# Patient Record
Sex: FEMALE | URBAN - METROPOLITAN AREA
[De-identification: names, ages, dates, MRNs, and addresses within clinical notes are randomized per-mention and may not be internally consistent; named-entity substitution may affect disease eponyms.]

---

## 2022-06-24 ENCOUNTER — OFFICE (OUTPATIENT)
Dept: URBAN - METROPOLITAN AREA CLINIC 90 | Facility: CLINIC | Age: 52
Setting detail: OPHTHALMOLOGY
End: 2022-06-24
Payer: COMMERCIAL

## 2022-06-24 DIAGNOSIS — H25.043: ICD-10-CM

## 2022-06-24 DIAGNOSIS — H35.373: ICD-10-CM

## 2022-06-24 PROCEDURE — 92004 COMPRE OPH EXAM NEW PT 1/>: CPT | Performed by: OPHTHALMOLOGY

## 2022-06-24 PROCEDURE — 92134 CPTRZ OPH DX IMG PST SGM RTA: CPT | Performed by: OPHTHALMOLOGY

## 2022-06-24 ASSESSMENT — REFRACTION_CURRENTRX
OS_CYLINDER: -1.25
OS_OVR_VA: 20/
OD_CYLINDER: -0.50
OD_SPHERE: -2.00
OD_OVR_VA: 20/
OD_AXIS: 078
OD_VPRISM_DIRECTION: SV
OS_AXIS: 079
OS_SPHERE: -1.25
OS_VPRISM_DIRECTION: SV

## 2022-06-24 ASSESSMENT — REFRACTION_MANIFEST
OS_ADD: +2.50
OD_VA1: 20/30
OS_SPHERE: -2.75
OS_VA1: 20/40-1
OS_VA2: 20/40(J3)
OS_CYLINDER: -1.00
OD_ADD: +2.50
OD_CYLINDER: -1.25
OD_SPHERE: -3.00
OD_AXIS: 090
OS_AXIS: 090
OD_VA2: 20/40(J3)

## 2022-06-24 ASSESSMENT — REFRACTION_AUTOREFRACTION
OD_AXIS: 089
OD_CYLINDER: -1.00
OS_SPHERE: -2.25
OD_SPHERE: -3.25
OS_CYLINDER: -1.75
OS_AXIS: 081

## 2022-06-24 ASSESSMENT — KERATOMETRY
OD_AXISANGLE_DEGREES: 095
OS_AXISANGLE_DEGREES: 078
OD_K2POWER_DIOPTERS: 41.50
METHOD_AUTO_MANUAL: AUTO
OS_K2POWER_DIOPTERS: 41.25
OD_K1POWER_DIOPTERS: 41.00
OS_K1POWER_DIOPTERS: 41.00

## 2022-06-24 ASSESSMENT — SPHEQUIV_DERIVED
OD_SPHEQUIV: -3.75
OD_SPHEQUIV: -3.625
OS_SPHEQUIV: -3.125
OS_SPHEQUIV: -3.25

## 2022-06-24 ASSESSMENT — VISUAL ACUITY
OS_BCVA: 20/50+2
OD_BCVA: 20/50

## 2022-06-24 ASSESSMENT — CONFRONTATIONAL VISUAL FIELD TEST (CVF)
OS_FINDINGS: FULL
OD_FINDINGS: FULL

## 2022-06-24 ASSESSMENT — AXIALLENGTH_DERIVED
OD_AL: 26.125
OS_AL: 25.8849
OS_AL: 25.9437
OD_AL: 26.0654

## 2022-06-24 ASSESSMENT — TONOMETRY
OS_IOP_MMHG: 12
OD_IOP_MMHG: 14

## 2022-10-03 ENCOUNTER — OFFICE (OUTPATIENT)
Dept: URBAN - METROPOLITAN AREA CLINIC 90 | Facility: CLINIC | Age: 52
Setting detail: OPHTHALMOLOGY
End: 2022-10-03
Payer: COMMERCIAL

## 2022-10-03 DIAGNOSIS — H25.13: ICD-10-CM

## 2022-10-03 DIAGNOSIS — H25.043: ICD-10-CM

## 2022-10-03 DIAGNOSIS — H35.373: ICD-10-CM

## 2022-10-03 PROCEDURE — 92014 COMPRE OPH EXAM EST PT 1/>: CPT | Performed by: OPHTHALMOLOGY

## 2022-10-03 ASSESSMENT — REFRACTION_CURRENTRX
OD_SPHERE: -2.00
OS_AXIS: 079
OS_VPRISM_DIRECTION: SV
OD_CYLINDER: -0.50
OS_SPHERE: -1.25
OS_CYLINDER: -1.25
OD_VPRISM_DIRECTION: SV
OD_AXIS: 078
OD_OVR_VA: 20/
OS_OVR_VA: 20/

## 2022-10-03 ASSESSMENT — REFRACTION_MANIFEST
OS_ADD: +2.50
OD_ADD: +2.50
OS_SPHERE: -2.75
OD_AXIS: 090
OD_VA2: 20/40(J3)
OD_CYLINDER: -1.25
OS_VA1: 20/40-1
OD_VA1: 20/30
OD_SPHERE: -3.00
OS_CYLINDER: -1.00
OS_VA2: 20/40(J3)
OS_AXIS: 090

## 2022-10-03 ASSESSMENT — AXIALLENGTH_DERIVED
OD_AL: 26.0654
OS_AL: 25.9437
OS_AL: 26.062

## 2022-10-03 ASSESSMENT — REFRACTION_AUTOREFRACTION
OS_AXIS: 069
OS_CYLINDER: -1.00
OS_SPHERE: -3.00

## 2022-10-03 ASSESSMENT — CONFRONTATIONAL VISUAL FIELD TEST (CVF)
OS_FINDINGS: FULL
OD_FINDINGS: FULL

## 2022-10-03 ASSESSMENT — SPHEQUIV_DERIVED
OS_SPHEQUIV: -3.25
OD_SPHEQUIV: -3.625
OS_SPHEQUIV: -3.5

## 2022-10-03 ASSESSMENT — VISUAL ACUITY
OD_BCVA: 20/60-2
OS_BCVA: 20/ CF

## 2022-10-03 ASSESSMENT — TONOMETRY
OS_IOP_MMHG: 13
OD_IOP_MMHG: 13

## 2022-10-03 ASSESSMENT — KERATOMETRY
OD_AXISANGLE_DEGREES: 095
METHOD_AUTO_MANUAL: AUTO
OS_K2POWER_DIOPTERS: 41.25
OD_K2POWER_DIOPTERS: 41.50
OS_AXISANGLE_DEGREES: 008
OD_K1POWER_DIOPTERS: 41.00
OS_K1POWER_DIOPTERS: 41.00

## 2022-11-01 ENCOUNTER — OFFICE (OUTPATIENT)
Dept: URBAN - METROPOLITAN AREA CLINIC 90 | Facility: CLINIC | Age: 52
Setting detail: OPHTHALMOLOGY
End: 2022-11-01
Payer: COMMERCIAL

## 2022-11-01 DIAGNOSIS — H25.11: ICD-10-CM

## 2022-11-01 PROCEDURE — 76519 ECHO EXAM OF EYE: CPT | Performed by: OPHTHALMOLOGY

## 2022-11-01 ASSESSMENT — VISUAL ACUITY
OD_BCVA: 20/150-
OS_BCVA: 20/ CF

## 2022-11-01 ASSESSMENT — REFRACTION_MANIFEST
OS_ADD: +2.50
OD_ADD: +2.50
OD_AXIS: 090
OD_VA1: 20/30
OS_VA2: 20/40(J3)
OS_VA1: 20/40-1
OS_CYLINDER: -1.00
OS_AXIS: 090
OS_SPHERE: -2.75
OD_SPHERE: -3.00
OD_CYLINDER: -1.25
OD_VA2: 20/40(J3)

## 2022-11-01 ASSESSMENT — REFRACTION_CURRENTRX
OD_CYLINDER: -0.50
OD_AXIS: 078
OD_SPHERE: -2.00
OS_AXIS: 079
OD_OVR_VA: 20/
OS_OVR_VA: 20/
OS_VPRISM_DIRECTION: SV
OS_CYLINDER: -1.25
OD_VPRISM_DIRECTION: SV
OS_SPHERE: -1.25

## 2022-11-01 ASSESSMENT — SPHEQUIV_DERIVED
OD_SPHEQUIV: -3.625
OS_SPHEQUIV: -5.25
OS_SPHEQUIV: -3.25

## 2022-11-01 ASSESSMENT — KERATOMETRY
OD_K2POWER_DIOPTERS: 41.25
METHOD_AUTO_MANUAL: AUTO
OS_K2POWER_DIOPTERS: 41.25
OD_K1POWER_DIOPTERS: 41.25
OS_K1POWER_DIOPTERS: 41.25
OD_AXISANGLE_DEGREES: 090
OS_AXISANGLE_DEGREES: 090

## 2022-11-01 ASSESSMENT — AXIALLENGTH_DERIVED
OS_AL: 26.8618
OD_AL: 26.0654
OS_AL: 25.8883

## 2022-11-01 ASSESSMENT — CONFRONTATIONAL VISUAL FIELD TEST (CVF)
OD_FINDINGS: FULL
OS_FINDINGS: FULL

## 2022-11-01 ASSESSMENT — REFRACTION_AUTOREFRACTION
OS_SPHERE: -4.00
OS_CYLINDER: -2.50
OS_AXIS: 083

## 2022-11-07 ENCOUNTER — AMBULATORY SURGERY CENTER (OUTPATIENT)
Dept: URBAN - METROPOLITAN AREA SURGERY 25 | Facility: SURGERY | Age: 52
Setting detail: OPHTHALMOLOGY
End: 2022-11-07
Payer: COMMERCIAL

## 2022-11-07 DIAGNOSIS — H25.21: ICD-10-CM

## 2022-11-07 PROCEDURE — 66984 XCAPSL CTRC RMVL W/O ECP: CPT | Performed by: OPHTHALMOLOGY

## 2022-11-08 ENCOUNTER — RX ONLY (RX ONLY)
Age: 52
End: 2022-11-08

## 2022-11-08 ENCOUNTER — OFFICE (OUTPATIENT)
Dept: URBAN - METROPOLITAN AREA CLINIC 90 | Facility: CLINIC | Age: 52
Setting detail: OPHTHALMOLOGY
End: 2022-11-08
Payer: COMMERCIAL

## 2022-11-08 DIAGNOSIS — Z96.1: ICD-10-CM

## 2022-11-08 PROCEDURE — 99024 POSTOP FOLLOW-UP VISIT: CPT | Performed by: OPHTHALMOLOGY

## 2022-11-08 ASSESSMENT — REFRACTION_MANIFEST
OD_AXIS: 090
OS_SPHERE: -2.75
OD_CYLINDER: -1.00
OS_AXIS: 090
OS_CYLINDER: -1.00
OD_SPHERE: -2.25
OD_VA2: 20/40(J3)
OD_VA1: 20/30
OD_ADD: +2.50
OS_ADD: +2.50
OS_VA2: 20/40(J3)
OD_AXIS: 090
OD_CYLINDER: -1.25
OS_CYLINDER: -1.50
OS_VA1: 20/40-1
OS_AXIS: 105
OD_VA1: 20/30
OS_SPHERE: -3.75
OD_SPHERE: -3.00

## 2022-11-08 ASSESSMENT — REFRACTION_AUTOREFRACTION
OS_CYLINDER: -2.50
OS_AXIS: 083
OS_SPHERE: -4.00

## 2022-11-08 ASSESSMENT — KERATOMETRY
OS_K1POWER_DIOPTERS: 41.25
OS_AXISANGLE_DEGREES: 090
OD_AXISANGLE_DEGREES: 090
OD_K2POWER_DIOPTERS: 41.25
OD_K1POWER_DIOPTERS: 41.25
OS_K2POWER_DIOPTERS: 41.25

## 2022-11-08 ASSESSMENT — SPHEQUIV_DERIVED
OD_SPHEQUIV: -3.625
OD_SPHEQUIV: -2.75
OS_SPHEQUIV: -4.5
OS_SPHEQUIV: -3.25
OS_SPHEQUIV: -5.25

## 2022-11-08 ASSESSMENT — REFRACTION_CURRENTRX
OD_CYLINDER: -0.50
OD_OVR_VA: 20/
OD_AXIS: 078
OS_SPHERE: -1.25
OS_CYLINDER: -1.25
OS_AXIS: 079
OD_SPHERE: -2.00
OD_VPRISM_DIRECTION: SV
OS_VPRISM_DIRECTION: SV
OS_OVR_VA: 20/

## 2022-11-08 ASSESSMENT — AXIALLENGTH_DERIVED
OD_AL: 26.0654
OS_AL: 26.8618
OS_AL: 25.8883
OD_AL: 25.6558
OS_AL: 26.4883

## 2022-11-08 ASSESSMENT — CORNEAL EDEMA CLINICAL DESCRIPTION: OD_CORNEALEDEMA: T DEEP FOLDS

## 2022-11-08 ASSESSMENT — TONOMETRY: OD_IOP_MMHG: 20

## 2022-11-08 ASSESSMENT — VISUAL ACUITY
OS_BCVA: 20/100
OD_BCVA: 20/200-1

## 2022-11-08 ASSESSMENT — CONFRONTATIONAL VISUAL FIELD TEST (CVF)
OS_FINDINGS: FULL
OD_FINDINGS: FULL

## 2022-11-15 ENCOUNTER — RX ONLY (RX ONLY)
Age: 52
End: 2022-11-15

## 2022-11-15 ENCOUNTER — OFFICE (OUTPATIENT)
Dept: URBAN - METROPOLITAN AREA CLINIC 90 | Facility: CLINIC | Age: 52
Setting detail: OPHTHALMOLOGY
End: 2022-11-15
Payer: COMMERCIAL

## 2022-11-15 DIAGNOSIS — Z96.1: ICD-10-CM

## 2022-11-15 DIAGNOSIS — H25.042: ICD-10-CM

## 2022-11-15 DIAGNOSIS — H25.12: ICD-10-CM

## 2022-11-15 PROCEDURE — 99024 POSTOP FOLLOW-UP VISIT: CPT | Performed by: OPHTHALMOLOGY

## 2022-11-15 ASSESSMENT — REFRACTION_MANIFEST
OD_CYLINDER: -1.25
OD_CYLINDER: -1.00
OS_AXIS: 105
OS_AXIS: 090
OD_SPHERE: -2.25
OS_SPHERE: -2.75
OS_SPHERE: -3.75
OD_VA1: 20/30
OS_ADD: +2.50
OD_VA2: 20/40(J3)
OD_SPHERE: -3.00
OD_VA1: 20/30
OD_AXIS: 090
OD_ADD: +2.50
OS_VA1: 20/40-1
OD_AXIS: 090
OS_CYLINDER: -1.00
OS_CYLINDER: -1.50
OS_VA2: 20/40(J3)

## 2022-11-15 ASSESSMENT — KERATOMETRY
OS_AXISANGLE_DEGREES: 090
OS_K1POWER_DIOPTERS: 41.25
OD_K2POWER_DIOPTERS: 41.25
OD_AXISANGLE_DEGREES: 090
OS_K2POWER_DIOPTERS: 41.25
OD_K1POWER_DIOPTERS: 41.25

## 2022-11-15 ASSESSMENT — SPHEQUIV_DERIVED
OS_SPHEQUIV: -5.25
OS_SPHEQUIV: -3.25
OD_SPHEQUIV: -3.625
OD_SPHEQUIV: -2.75
OS_SPHEQUIV: -4.5

## 2022-11-15 ASSESSMENT — REFRACTION_CURRENTRX
OS_SPHERE: -1.25
OD_SPHERE: -2.00
OD_OVR_VA: 20/
OS_VPRISM_DIRECTION: SV
OS_AXIS: 079
OD_CYLINDER: -0.50
OS_OVR_VA: 20/
OD_VPRISM_DIRECTION: SV
OD_AXIS: 078
OS_CYLINDER: -1.25

## 2022-11-15 ASSESSMENT — VISUAL ACUITY
OD_BCVA: CF
OS_BCVA: 20/50

## 2022-11-15 ASSESSMENT — CORNEAL EDEMA CLINICAL DESCRIPTION: OD_CORNEALEDEMA: T

## 2022-11-15 ASSESSMENT — AXIALLENGTH_DERIVED
OS_AL: 25.8883
OD_AL: 26.0654
OS_AL: 26.8618
OD_AL: 25.6558
OS_AL: 26.4883

## 2022-11-15 ASSESSMENT — TONOMETRY: OD_IOP_MMHG: 18

## 2022-11-15 ASSESSMENT — REFRACTION_AUTOREFRACTION
OS_CYLINDER: -2.50
OS_SPHERE: -4.00
OS_AXIS: 083

## 2022-11-15 ASSESSMENT — CONFRONTATIONAL VISUAL FIELD TEST (CVF)
OD_FINDINGS: FULL
OS_FINDINGS: FULL

## 2022-11-29 ENCOUNTER — OFFICE (OUTPATIENT)
Dept: URBAN - METROPOLITAN AREA CLINIC 90 | Facility: CLINIC | Age: 52
Setting detail: OPHTHALMOLOGY
End: 2022-11-29
Payer: COMMERCIAL

## 2022-11-29 DIAGNOSIS — Z96.1: ICD-10-CM

## 2022-11-29 DIAGNOSIS — H25.042: ICD-10-CM

## 2022-11-29 PROCEDURE — 99024 POSTOP FOLLOW-UP VISIT: CPT | Performed by: OPHTHALMOLOGY

## 2022-11-29 ASSESSMENT — SPHEQUIV_DERIVED
OD_SPHEQUIV: -2.75
OS_SPHEQUIV: -4.5
OS_SPHEQUIV: -2.625
OD_SPHEQUIV: -3.625
OS_SPHEQUIV: -3.25

## 2022-11-29 ASSESSMENT — REFRACTION_MANIFEST
OS_CYLINDER: -1.50
OD_VA2: 20/40(J3)
OS_SPHERE: -2.75
OD_SPHERE: -2.25
OD_CYLINDER: -1.25
OS_SPHERE: -3.75
OD_SPHERE: -3.00
OD_AXIS: 090
OD_CYLINDER: -1.00
OS_AXIS: 105
OD_VA1: 20/30
OS_ADD: +2.50
OS_AXIS: 090
OS_VA2: 20/40(J3)
OD_AXIS: 090
OD_ADD: +2.50
OS_VA1: 20/40-1
OD_VA1: 20/30
OS_CYLINDER: -1.00

## 2022-11-29 ASSESSMENT — REFRACTION_CURRENTRX
OS_OVR_VA: 20/
OD_OVR_VA: 20/
OS_VPRISM_DIRECTION: SV
OD_VPRISM_DIRECTION: SV
OS_AXIS: 079
OS_SPHERE: -1.25
OD_CYLINDER: -0.50
OD_SPHERE: -2.00
OD_AXIS: 078
OS_CYLINDER: -1.25

## 2022-11-29 ASSESSMENT — AXIALLENGTH_DERIVED
OS_AL: 25.6525
OD_AL: 6.22
OS_AL: 26.5463
OD_AL: 6.25
OS_AL: 25.9437

## 2022-11-29 ASSESSMENT — REFRACTION_AUTOREFRACTION
OS_SPHERE: -2.50
OS_CYLINDER: -0.25
OD_SPHERE: UTP
OS_AXIS: 161

## 2022-11-29 ASSESSMENT — KERATOMETRY
OD_K2POWER_DIOPTERS: 41.50
OS_AXISANGLE_DEGREES: 180
OS_K2POWER_DIOPTERS: 41.25
OD_K1POWER_DIOPTERS: 410.00
OS_K1POWER_DIOPTERS: 41.00
OD_AXISANGLE_DEGREES: 102

## 2022-11-29 ASSESSMENT — VISUAL ACUITY
OS_BCVA: 20/80-
OD_BCVA: CF

## 2022-11-29 ASSESSMENT — TONOMETRY: OD_IOP_MMHG: 20

## 2022-12-05 ENCOUNTER — AMBULATORY SURGERY CENTER (OUTPATIENT)
Dept: URBAN - METROPOLITAN AREA SURGERY 25 | Facility: SURGERY | Age: 52
Setting detail: OPHTHALMOLOGY
End: 2022-12-05
Payer: COMMERCIAL

## 2022-12-05 DIAGNOSIS — H57.03: ICD-10-CM

## 2022-12-05 DIAGNOSIS — H25.22: ICD-10-CM

## 2022-12-05 PROCEDURE — 66982 XCAPSL CTRC RMVL CPLX WO ECP: CPT | Performed by: OPHTHALMOLOGY

## 2022-12-06 ENCOUNTER — OFFICE (OUTPATIENT)
Dept: URBAN - METROPOLITAN AREA CLINIC 90 | Facility: CLINIC | Age: 52
Setting detail: OPHTHALMOLOGY
End: 2022-12-06
Payer: COMMERCIAL

## 2022-12-06 ENCOUNTER — RX ONLY (RX ONLY)
Age: 52
End: 2022-12-06

## 2022-12-06 DIAGNOSIS — Z96.1: ICD-10-CM

## 2022-12-06 PROBLEM — H35.373 EPIRETINAL MEMBRANE; BOTH EYES: Status: ACTIVE | Noted: 2022-06-24

## 2022-12-06 PROCEDURE — 99024 POSTOP FOLLOW-UP VISIT: CPT | Performed by: OPHTHALMOLOGY

## 2022-12-06 ASSESSMENT — REFRACTION_AUTOREFRACTION
OS_SPHERE: -2.50
OS_CYLINDER: -0.25
OS_AXIS: 161
OD_SPHERE: UTP

## 2022-12-06 ASSESSMENT — REFRACTION_CURRENTRX
OD_VPRISM_DIRECTION: SV
OD_AXIS: 078
OS_CYLINDER: -1.25
OD_CYLINDER: -0.50
OS_SPHERE: -1.25
OD_SPHERE: -2.00
OS_AXIS: 079
OD_OVR_VA: 20/
OS_VPRISM_DIRECTION: SV
OS_OVR_VA: 20/

## 2022-12-06 ASSESSMENT — REFRACTION_MANIFEST
OD_SPHERE: -3.00
OS_ADD: +2.50
OD_AXIS: 090
OS_VA2: 20/40(J3)
OS_AXIS: 090
OS_CYLINDER: -1.50
OD_VA2: 20/40(J3)
OD_AXIS: 090
OD_ADD: +2.50
OD_VA1: 20/30
OS_AXIS: 105
OD_SPHERE: -2.25
OS_VA1: 20/40-1
OS_SPHERE: -3.75
OS_CYLINDER: -1.00
OD_VA1: 20/30
OD_CYLINDER: -1.25
OD_CYLINDER: -1.00
OS_SPHERE: -2.75

## 2022-12-06 ASSESSMENT — SPHEQUIV_DERIVED
OS_SPHEQUIV: -2.625
OD_SPHEQUIV: -2.75
OD_SPHEQUIV: -3.625
OS_SPHEQUIV: -4.5
OS_SPHEQUIV: -3.25

## 2022-12-06 ASSESSMENT — KERATOMETRY
OS_K1POWER_DIOPTERS: 41.00
OD_K1POWER_DIOPTERS: 410.00
OS_K2POWER_DIOPTERS: 41.25
OS_AXISANGLE_DEGREES: 180
OD_K2POWER_DIOPTERS: 41.50
OD_AXISANGLE_DEGREES: 102

## 2022-12-06 ASSESSMENT — VISUAL ACUITY: OS_BCVA: 20/100-

## 2022-12-06 ASSESSMENT — AXIALLENGTH_DERIVED
OS_AL: 25.6525
OS_AL: 25.9437
OD_AL: 6.22
OD_AL: 6.25
OS_AL: 26.5463

## 2022-12-06 ASSESSMENT — CORNEAL EDEMA CLINICAL DESCRIPTION: OS_CORNEALEDEMA: INFERIORLY

## 2022-12-06 ASSESSMENT — CONFRONTATIONAL VISUAL FIELD TEST (CVF)
OS_FINDINGS: FULL
OD_FINDINGS: FULL

## 2022-12-13 ENCOUNTER — OFFICE (OUTPATIENT)
Dept: URBAN - METROPOLITAN AREA CLINIC 90 | Facility: CLINIC | Age: 52
Setting detail: OPHTHALMOLOGY
End: 2022-12-13
Payer: COMMERCIAL

## 2022-12-13 DIAGNOSIS — Z96.1: ICD-10-CM

## 2022-12-13 PROCEDURE — 99024 POSTOP FOLLOW-UP VISIT: CPT | Performed by: OPHTHALMOLOGY

## 2022-12-13 ASSESSMENT — REFRACTION_CURRENTRX
OD_VPRISM_DIRECTION: SV
OS_VPRISM_DIRECTION: SV
OS_CYLINDER: -1.25
OD_CYLINDER: -0.50
OD_SPHERE: -2.00
OD_OVR_VA: 20/
OS_SPHERE: -1.25
OS_OVR_VA: 20/
OS_AXIS: 079
OD_AXIS: 078

## 2022-12-13 ASSESSMENT — KERATOMETRY
OD_K2POWER_DIOPTERS: 41.50
OD_AXISANGLE_DEGREES: 102
OS_K2POWER_DIOPTERS: 41.50
OS_K1POWER_DIOPTERS: 41.00
OD_K1POWER_DIOPTERS: 41.00
OS_AXISANGLE_DEGREES: 099

## 2022-12-13 ASSESSMENT — REFRACTION_AUTOREFRACTION
OD_SPHERE: -2.75
OD_CYLINDER: -0.25
OS_CYLINDER: -0.75
OS_AXIS: 069
OS_SPHERE: -2.75
OD_AXIS: 112

## 2022-12-13 ASSESSMENT — VISUAL ACUITY
OD_BCVA: 20/200
OS_BCVA: 20/200

## 2022-12-13 ASSESSMENT — REFRACTION_MANIFEST
OS_VA1: 20/40-1
OS_SPHERE: -2.75
OD_VA1: 20/20
OD_AXIS: 090
OD_CYLINDER: -0.25
OD_SPHERE: -2.75
OS_SPHERE: -2.75
OD_ADD: +2.50
OS_ADD: +2.50
OS_CYLINDER: -1.00
OD_AXIS: 110
OD_CYLINDER: -1.25
OD_VA1: 20/30
OS_AXIS: 090
OS_AXIS: 065
OS_SPHERE: -3.75
OD_VA2: 20/40(J3)
OS_CYLINDER: -1.50
OS_VA2: 20/40(J3)
OD_CYLINDER: -1.00
OS_VA1: 20/20
OD_VA1: 20/30
OS_CYLINDER: -0.50
OD_AXIS: 090
OS_AXIS: 105
OD_SPHERE: -2.25
OS_ADD: +2.75
OD_SPHERE: -3.00
OD_ADD: +2.75

## 2022-12-13 ASSESSMENT — CONFRONTATIONAL VISUAL FIELD TEST (CVF)
OD_FINDINGS: FULL
OS_FINDINGS: FULL

## 2022-12-13 ASSESSMENT — AXIALLENGTH_DERIVED
OS_AL: 26.4883
OS_AL: 25.7715
OS_AL: 25.8883
OS_AL: 25.8297
OD_AL: 25.7135
OD_AL: 26.0654
OD_AL: 25.6558
OD_AL: 25.7135

## 2022-12-13 ASSESSMENT — SPHEQUIV_DERIVED
OS_SPHEQUIV: -4.5
OD_SPHEQUIV: -2.75
OD_SPHEQUIV: -2.875
OD_SPHEQUIV: -2.875
OS_SPHEQUIV: -3
OS_SPHEQUIV: -3.125
OS_SPHEQUIV: -3.25
OD_SPHEQUIV: -3.625

## 2022-12-13 ASSESSMENT — CORNEAL EDEMA CLINICAL DESCRIPTION: OS_CORNEALEDEMA: ABSENT

## 2023-01-03 ENCOUNTER — OFFICE (OUTPATIENT)
Dept: URBAN - METROPOLITAN AREA CLINIC 90 | Facility: CLINIC | Age: 53
Setting detail: OPHTHALMOLOGY
End: 2023-01-03
Payer: COMMERCIAL

## 2023-01-03 DIAGNOSIS — Z96.1: ICD-10-CM

## 2023-01-03 PROCEDURE — 99024 POSTOP FOLLOW-UP VISIT: CPT | Performed by: OPHTHALMOLOGY

## 2023-01-03 ASSESSMENT — SPHEQUIV_DERIVED
OS_SPHEQUIV: -3
OS_SPHEQUIV: -3.125
OD_SPHEQUIV: -2.75
OS_SPHEQUIV: -4.5
OD_SPHEQUIV: -2.625
OS_SPHEQUIV: -3.25
OD_SPHEQUIV: -2.875
OD_SPHEQUIV: -3.625

## 2023-01-03 ASSESSMENT — REFRACTION_MANIFEST
OD_AXIS: 090
OD_ADD: +2.75
OS_SPHERE: -2.75
OS_SPHERE: -3.75
OD_VA1: 20/20
OS_CYLINDER: -1.50
OD_CYLINDER: -1.25
OD_VA2: 20/40(J3)
OS_VA2: 20/40(J3)
OS_AXIS: 065
OS_ADD: +2.50
OS_CYLINDER: -1.00
OS_ADD: +2.75
OS_SPHERE: -2.75
OD_VA1: 20/30
OD_AXIS: 110
OD_SPHERE: -2.25
OS_AXIS: 090
OD_CYLINDER: -1.00
OD_SPHERE: -2.75
OS_VA1: 20/40-1
OS_CYLINDER: -0.50
OD_ADD: +2.50
OD_AXIS: 090
OS_AXIS: 105
OD_VA1: 20/30
OS_VA1: 20/20
OD_SPHERE: -3.00
OD_CYLINDER: -0.25

## 2023-01-03 ASSESSMENT — AXIALLENGTH_DERIVED
OS_AL: 26.4883
OD_AL: 26.0095
OS_AL: 25.7715
OS_AL: 25.8297
OD_AL: 25.6016
OD_AL: 25.5444
OS_AL: 25.8883
OD_AL: 25.6591

## 2023-01-03 ASSESSMENT — KERATOMETRY
OS_K2POWER_DIOPTERS: 41.25
OD_AXISANGLE_DEGREES: 104
OS_K1POWER_DIOPTERS: 41.25
OD_K1POWER_DIOPTERS: 41.00
OD_K2POWER_DIOPTERS: 41.75
OS_AXISANGLE_DEGREES: 090

## 2023-01-03 ASSESSMENT — REFRACTION_CURRENTRX
OS_AXIS: 079
OS_CYLINDER: -1.25
OD_CYLINDER: -0.50
OD_OVR_VA: 20/
OD_AXIS: 078
OS_VPRISM_DIRECTION: SV
OD_VPRISM_DIRECTION: SV
OS_SPHERE: -1.25
OD_SPHERE: -2.00
OS_OVR_VA: 20/

## 2023-01-03 ASSESSMENT — TONOMETRY
OS_IOP_MMHG: 14
OD_IOP_MMHG: 14

## 2023-01-03 ASSESSMENT — VISUAL ACUITY
OD_BCVA: 20/150-1
OS_BCVA: 20/100

## 2023-01-03 ASSESSMENT — REFRACTION_AUTOREFRACTION
OS_CYLINDER: -0.75
OD_SPHERE: -2.50
OD_CYLINDER: -0.25
OS_AXIS: 069
OD_AXIS: 040
OS_SPHERE: -2.75

## 2023-01-03 ASSESSMENT — CONFRONTATIONAL VISUAL FIELD TEST (CVF)
OD_FINDINGS: FULL
OS_FINDINGS: FULL

## 2023-01-03 ASSESSMENT — CORNEAL EDEMA CLINICAL DESCRIPTION: OS_CORNEALEDEMA: ABSENT

## 2023-03-16 PROBLEM — Z00.00 ENCOUNTER FOR PREVENTIVE HEALTH EXAMINATION: Status: ACTIVE | Noted: 2023-03-16

## 2023-03-17 ENCOUNTER — APPOINTMENT (OUTPATIENT)
Dept: GASTROENTEROLOGY | Facility: CLINIC | Age: 53
End: 2023-03-17
Payer: COMMERCIAL

## 2023-03-17 VITALS
DIASTOLIC BLOOD PRESSURE: 82 MMHG | RESPIRATION RATE: 12 BRPM | HEART RATE: 87 BPM | OXYGEN SATURATION: 98 % | TEMPERATURE: 97.3 F | SYSTOLIC BLOOD PRESSURE: 138 MMHG

## 2023-03-17 VITALS — WEIGHT: 187 LBS | BODY MASS INDEX: 31.92 KG/M2 | HEIGHT: 64 IN

## 2023-03-17 DIAGNOSIS — Z12.11 ENCOUNTER FOR SCREENING FOR MALIGNANT NEOPLASM OF COLON: ICD-10-CM

## 2023-03-17 DIAGNOSIS — Z80.0 FAMILY HISTORY OF MALIGNANT NEOPLASM OF DIGESTIVE ORGANS: ICD-10-CM

## 2023-03-17 PROCEDURE — 99203 OFFICE O/P NEW LOW 30 MIN: CPT

## 2023-03-18 RX ORDER — DICLOFENAC SODIUM 1 %
1 KIT TOPICAL
Refills: 0 | Status: ACTIVE | COMMUNITY

## 2023-03-18 NOTE — ASSESSMENT
[FreeTextEntry1] : 53 yo female, referred for initial colon cancer screening. Her Sister just  form colon cancer age 55. Not up to date with mammogram.There is no change in bowel habits. There is no weight loss or anorexia. There is no rectal bleeding. Not exercising, no sob or cp.\par \par IMP:\par \par family hx of colon cancer\par \par PLAN:\par 1. She was advised to undergo colonoscopy to which she  agreed. The procedure will be performed in [facility] with the assistance of an anesthesiologist. The procedure was explained in detail and she understood the risks of the procedure not limited  to infection, bleeding, perforation, risk of anesthesia and risk of IV site problems,emergency surgery, missed lesions  or non-diagnosis of colon cancer. She  was advised that she could not drive home alone, if the patient chooses to receive sedation. Further diagnostic and treatment recommendations will be based upon the procedure and any biopsies, if they are taken.\par 2. advised pt to schedule her mammogram

## 2023-03-18 NOTE — HISTORY OF PRESENT ILLNESS
[FreeTextEntry1] : 51 yo female, referred for initial colon cancer screening. Her Sister just  form colon cancer age 55. Not up to date with mammogram.There is no change in bowel habits. There is no weight loss or anorexia. There is no rectal bleeding. Not exercising, no sob or cp.

## 2023-03-18 NOTE — REASON FOR VISIT
[Initial Evaluation] : an initial evaluation [FreeTextEntry1] : colon cancer screening, family hx of colon cancer

## 2023-03-18 NOTE — PHYSICAL EXAM

## 2023-05-18 ENCOUNTER — NON-APPOINTMENT (OUTPATIENT)
Age: 53
End: 2023-05-18

## 2023-05-18 ENCOUNTER — APPOINTMENT (OUTPATIENT)
Dept: GASTROENTEROLOGY | Facility: AMBULATORY SURGERY CENTER | Age: 53
End: 2023-05-18

## 2023-06-02 ENCOUNTER — APPOINTMENT (OUTPATIENT)
Dept: OBGYN | Facility: CLINIC | Age: 53
End: 2023-06-02

## 2023-07-05 ENCOUNTER — APPOINTMENT (OUTPATIENT)
Dept: GASTROENTEROLOGY | Facility: AMBULATORY SURGERY CENTER | Age: 53
End: 2023-07-05
Payer: COMMERCIAL

## 2023-07-05 ENCOUNTER — NON-APPOINTMENT (OUTPATIENT)
Age: 53
End: 2023-07-05

## 2023-07-05 PROCEDURE — 45378 DIAGNOSTIC COLONOSCOPY: CPT | Mod: 33

## 2023-07-05 RX ORDER — SODIUM PICOSULFATE, MAGNESIUM OXIDE, AND ANHYDROUS CITRIC ACID 10; 3.5; 12 MG/160ML; G/160ML; G/160ML
10-3.5-12 MG-GM LIQUID ORAL
Qty: 320 | Refills: 0 | Status: DISCONTINUED | COMMUNITY
Start: 2023-03-17 | End: 2023-07-05

## 2023-08-04 RX ORDER — SODIUM SULFATE, MAGNESIUM SULFATE, AND POTASSIUM CHLORIDE 17.75; 2.7; 2.25 G/1; G/1; G/1
1479-225-188 TABLET ORAL
Qty: 24 | Refills: 0 | Status: ACTIVE | COMMUNITY
Start: 2023-08-04 | End: 1900-01-01

## 2023-09-11 ENCOUNTER — APPOINTMENT (OUTPATIENT)
Dept: OBGYN | Facility: CLINIC | Age: 53
End: 2023-09-11
Payer: COMMERCIAL

## 2023-09-11 VITALS
HEIGHT: 63 IN | BODY MASS INDEX: 32.07 KG/M2 | SYSTOLIC BLOOD PRESSURE: 139 MMHG | DIASTOLIC BLOOD PRESSURE: 90 MMHG | WEIGHT: 181 LBS

## 2023-09-11 DIAGNOSIS — Z01.419 ENCOUNTER FOR GYNECOLOGICAL EXAMINATION (GENERAL) (ROUTINE) W/OUT ABNORMAL FINDINGS: ICD-10-CM

## 2023-09-11 DIAGNOSIS — Z11.51 ENCOUNTER FOR SCREENING FOR HUMAN PAPILLOMAVIRUS (HPV): ICD-10-CM

## 2023-09-11 PROCEDURE — 99386 PREV VISIT NEW AGE 40-64: CPT

## 2023-09-14 PROBLEM — Z01.419 PAPANICOLAOU SMEAR, AS PART OF ROUTINE GYNECOLOGICAL EXAMINATION: Status: ACTIVE | Noted: 2023-09-11

## 2023-09-14 PROBLEM — Z11.51 ENCOUNTER FOR SCREENING FOR HUMAN PAPILLOMAVIRUS (HPV): Status: ACTIVE | Noted: 2023-09-11

## 2023-09-14 RX ORDER — AMLODIPINE BESYLATE 5 MG/1
5 TABLET ORAL
Qty: 90 | Refills: 0 | Status: ACTIVE | COMMUNITY
Start: 2023-07-13

## 2023-09-14 RX ORDER — VALSARTAN 160 MG/1
160 TABLET, COATED ORAL
Qty: 90 | Refills: 0 | Status: ACTIVE | COMMUNITY
Start: 2023-07-13

## 2023-09-14 RX ORDER — METFORMIN HYDROCHLORIDE 500 MG/1
500 TABLET, COATED ORAL
Qty: 180 | Refills: 0 | Status: ACTIVE | COMMUNITY
Start: 2023-07-14

## 2023-09-19 LAB
CYTOLOGY CVX/VAG DOC THIN PREP: NORMAL
HPV HIGH+LOW RISK DNA PNL CVX: NOT DETECTED

## 2023-10-02 ENCOUNTER — RESULT REVIEW (OUTPATIENT)
Age: 53
End: 2023-10-02

## 2023-10-03 ENCOUNTER — APPOINTMENT (OUTPATIENT)
Dept: GASTROENTEROLOGY | Facility: AMBULATORY SURGERY CENTER | Age: 53
End: 2023-10-03
Payer: COMMERCIAL

## 2023-10-03 PROCEDURE — 45385 COLONOSCOPY W/LESION REMOVAL: CPT

## 2023-10-09 ENCOUNTER — NON-APPOINTMENT (OUTPATIENT)
Age: 53
End: 2023-10-09

## 2024-08-30 PROBLEM — H11.041 PTERYGIUM-PERIPHERAL; RIGHT EYE: Status: ACTIVE | Noted: 2024-08-30

## 2024-08-30 PROBLEM — E11.9 TYPE 2 DIABETES TYPE 2 NO RETINOPATHY ; BOTH EYES: Status: ACTIVE | Noted: 2024-08-30

## 2025-03-05 ENCOUNTER — APPOINTMENT (OUTPATIENT)
Dept: OBGYN | Facility: CLINIC | Age: 55
End: 2025-03-05